# Patient Record
Sex: FEMALE | Race: WHITE | NOT HISPANIC OR LATINO | Employment: FULL TIME | URBAN - METROPOLITAN AREA
[De-identification: names, ages, dates, MRNs, and addresses within clinical notes are randomized per-mention and may not be internally consistent; named-entity substitution may affect disease eponyms.]

---

## 2023-02-17 PROBLEM — E34.9 HORMONAL DISORDER: Status: ACTIVE | Noted: 2023-02-17

## 2023-02-17 PROBLEM — R20.2 NUMBNESS AND TINGLING IN RIGHT HAND: Status: ACTIVE | Noted: 2023-02-17

## 2023-02-17 PROBLEM — R53.82 CHRONIC FATIGUE: Status: ACTIVE | Noted: 2023-02-17

## 2023-02-17 PROBLEM — G56.01 CARPAL TUNNEL SYNDROME OF RIGHT WRIST: Status: ACTIVE | Noted: 2023-02-17

## 2023-02-17 PROBLEM — M46.1 SACROILIITIS (CMS-HCC): Status: ACTIVE | Noted: 2023-02-17

## 2023-02-17 PROBLEM — R20.0 NUMBNESS AND TINGLING IN RIGHT HAND: Status: ACTIVE | Noted: 2023-02-17

## 2023-02-17 PROBLEM — K63.5 POLYP OF COLON: Status: ACTIVE | Noted: 2023-02-17

## 2023-02-17 PROBLEM — M25.531 RIGHT WRIST PAIN: Status: ACTIVE | Noted: 2023-02-17

## 2023-02-17 PROBLEM — K58.9 IRRITABLE BOWEL SYNDROME: Status: ACTIVE | Noted: 2023-02-17

## 2023-02-17 PROBLEM — E78.5 HYPERLIPEMIA: Status: ACTIVE | Noted: 2023-02-17

## 2023-02-17 PROBLEM — F41.9 ANXIETY: Status: ACTIVE | Noted: 2023-02-17

## 2023-02-17 PROBLEM — F32.A MILD DEPRESSION: Status: ACTIVE | Noted: 2023-02-17

## 2023-02-17 PROBLEM — E55.9 VITAMIN D DEFICIENCY: Status: ACTIVE | Noted: 2023-02-17

## 2023-02-17 PROBLEM — R23.2 HOT FLASHES: Status: ACTIVE | Noted: 2023-02-17

## 2023-02-17 RX ORDER — VENLAFAXINE HYDROCHLORIDE 75 MG/1
1 CAPSULE, EXTENDED RELEASE ORAL
COMMUNITY
Start: 2022-04-14 | End: 2023-04-17

## 2023-02-17 RX ORDER — MULTIVITAMIN
1 TABLET ORAL DAILY
COMMUNITY
Start: 2018-11-27

## 2023-02-17 RX ORDER — IBUPROFEN 800 MG/1
1 TABLET ORAL EVERY 8 HOURS PRN
COMMUNITY
Start: 2022-03-15

## 2023-02-17 RX ORDER — ACETAMINOPHEN 500 MG
1 TABLET ORAL DAILY
COMMUNITY
Start: 2018-11-27

## 2023-04-17 ENCOUNTER — OFFICE VISIT (OUTPATIENT)
Dept: PRIMARY CARE | Facility: CLINIC | Age: 53
End: 2023-04-17
Payer: COMMERCIAL

## 2023-04-17 VITALS
DIASTOLIC BLOOD PRESSURE: 74 MMHG | SYSTOLIC BLOOD PRESSURE: 110 MMHG | BODY MASS INDEX: 22.07 KG/M2 | OXYGEN SATURATION: 96 % | HEART RATE: 88 BPM | WEIGHT: 140.6 LBS | TEMPERATURE: 97.7 F | RESPIRATION RATE: 16 BRPM | HEIGHT: 67 IN

## 2023-04-17 DIAGNOSIS — Z00.00 ROUTINE MEDICAL EXAM: Primary | ICD-10-CM

## 2023-04-17 DIAGNOSIS — E78.2 MIXED HYPERLIPIDEMIA: ICD-10-CM

## 2023-04-17 DIAGNOSIS — M25.552 LEFT HIP PAIN: ICD-10-CM

## 2023-04-17 DIAGNOSIS — Z78.0 MENOPAUSE: ICD-10-CM

## 2023-04-17 DIAGNOSIS — Z12.31 BREAST CANCER SCREENING BY MAMMOGRAM: ICD-10-CM

## 2023-04-17 DIAGNOSIS — E55.9 VITAMIN D DEFICIENCY: ICD-10-CM

## 2023-04-17 DIAGNOSIS — Z87.891 FORMER SMOKER: ICD-10-CM

## 2023-04-17 DIAGNOSIS — F32.A MILD DEPRESSION: ICD-10-CM

## 2023-04-17 PROCEDURE — 99212 OFFICE O/P EST SF 10 MIN: CPT | Performed by: NURSE PRACTITIONER

## 2023-04-17 PROCEDURE — 99396 PREV VISIT EST AGE 40-64: CPT | Performed by: NURSE PRACTITIONER

## 2023-04-17 PROCEDURE — 3008F BODY MASS INDEX DOCD: CPT | Performed by: NURSE PRACTITIONER

## 2023-04-17 PROCEDURE — 1036F TOBACCO NON-USER: CPT | Performed by: NURSE PRACTITIONER

## 2023-04-17 RX ORDER — VENLAFAXINE HYDROCHLORIDE 150 MG/1
150 CAPSULE, EXTENDED RELEASE ORAL DAILY
Qty: 30 CAPSULE | Refills: 11 | Status: SHIPPED | OUTPATIENT
Start: 2023-04-17 | End: 2024-04-11 | Stop reason: SDUPTHER

## 2023-04-17 ASSESSMENT — ENCOUNTER SYMPTOMS
NECK STIFFNESS: 0
PALPITATIONS: 0
DEPRESSION: 0
HEMATURIA: 0
BLOOD IN STOOL: 0
EYE REDNESS: 0
COUGH: 0
FATIGUE: 0
SHORTNESS OF BREATH: 0
CHILLS: 0
ADENOPATHY: 0
ABDOMINAL PAIN: 0
BACK PAIN: 0
WEAKNESS: 0
RHINORRHEA: 0
HEADACHES: 0
WOUND: 0
OCCASIONAL FEELINGS OF UNSTEADINESS: 0
LOSS OF SENSATION IN FEET: 0
FEVER: 0
POLYDIPSIA: 0
SORE THROAT: 0
BRUISES/BLEEDS EASILY: 0
HALLUCINATIONS: 0
DYSURIA: 0
EYE PAIN: 0

## 2023-04-17 ASSESSMENT — PROMIS GLOBAL HEALTH SCALE
CARRYOUT_PHYSICAL_ACTIVITIES: COMPLETELY
EMOTIONAL_PROBLEMS: SOMETIMES
RATE_SOCIAL_SATISFACTION: VERY GOOD
RATE_MENTAL_HEALTH: GOOD
RATE_PHYSICAL_HEALTH: GOOD
RATE_AVERAGE_PAIN: 3
CARRYOUT_SOCIAL_ACTIVITIES: GOOD
RATE_GENERAL_HEALTH: GOOD
RATE_QUALITY_OF_LIFE: VERY GOOD
RATE_AVERAGE_FATIGUE: MILD

## 2023-04-17 ASSESSMENT — PATIENT HEALTH QUESTIONNAIRE - PHQ9
SUM OF ALL RESPONSES TO PHQ9 QUESTIONS 1 AND 2: 0
1. LITTLE INTEREST OR PLEASURE IN DOING THINGS: NOT AT ALL
2. FEELING DOWN, DEPRESSED OR HOPELESS: NOT AT ALL

## 2023-04-17 NOTE — ASSESSMENT & PLAN NOTE
Completed 4/17/2023, exam benign with BMI 22, patient is a non-smoker, up-to-date on vaccinations, colonoscopy completed 10/31/2022--repeat in 3 years, s/p total hysterectomy in 2013, mammogram ordered, DEXA scan ordered, patient given requisition for blood work

## 2023-04-17 NOTE — PROGRESS NOTES
Jaimie Gardner is a 53 y.o. female with Chief Complaint of Annual Exam.    HPI: 53-year-old female here for CPE.  She reports she is taking her medication as prescribed, denies any side effects.  She is requesting that we resume her previous dose of 150 mg venlafaxine as she feels her anxiety and depression got a little worse after decrease to 75 mg.  She does complain of significant left hip pain.  States that it will often occur after she has been sitting, when she goes to stand up and start walking she will have severe pain in her lateral buttock, she denies any radiation of the pain into the groin or down the leg, denies any back pain, no numbness or tingling.  She states that at its worst the pain is 8/10, will often go almost completely away after she is up on her feet for a little bit, is taking nothing over-the-counter for pain management.  She denies any other acute issues or concerns.  Discussed recommended screening and blood work.  Recent Surgeries in Family Medicine            No cases to display          Social History     Socioeconomic History    Marital status:      Spouse name: Not on file    Number of children: Not on file    Years of education: Not on file    Highest education level: Not on file   Occupational History    Not on file   Tobacco Use    Smoking status: Former     Types: Cigarettes     Passive exposure: Past    Smokeless tobacco: Never   Vaping Use    Vaping status: Never Used   Substance and Sexual Activity    Alcohol use: Not Currently    Drug use: Never    Sexual activity: Defer   Other Topics Concern    Not on file   Social History Narrative    Not on file     Social Determinants of Health     Financial Resource Strain: Not on file   Food Insecurity: Not on file   Transportation Needs: Not on file   Physical Activity: Not on file   Stress: Not on file   Social Connections: Not on file   Intimate Partner Violence: Not on file   Housing Stability: Not on file     Past Medical  History:   Diagnosis Date    Acute atopic conjunctivitis, right eye 08/03/2021    Allergic conjunctivitis of right eye    Body mass index (BMI) 23.0-23.9, adult 04/14/2021    BMI 23.0-23.9, adult    Cellulitis of face 06/03/2020    Cellulitis diffuse, face    Low back pain, unspecified 11/27/2018    Acute low back pain    Low back pain, unspecified 11/27/2018    Acute bilateral low back pain without sciatica    Other mucopurulent conjunctivitis, right eye 05/31/2017    Pink eye, right    Sprain of other parts of lumbar spine and pelvis, initial encounter 11/27/2018    Sacrum sprain    Unspecified conjunctivitis 06/03/2020    Right conjunctivitis    Unspecified symptoms and signs involving the genitourinary system 01/28/2020    UTI symptoms      Family History   Problem Relation Name Age of Onset    Depression Mother      Ovarian cancer Mother      Asthma Father      Hypertension Father      Hyperthyroidism Maternal Grandmother        Immunization History   Administered Date(s) Administered    DTaP 03/10/2021    Influenza, Unspecified 11/20/2014, 12/15/2021    Influenza, seasonal, injectable 10/02/2020    Moderna SARS-CoV-2 Vaccination 01/06/2021, 02/03/2021    Pfizer Purple Cap SARS-CoV-2 12/16/2021    TD (adult), 2 Lf tetanus toxoid, preservative free, adsorbed 03/03/2011    Td (adult), 5 Lf tetanus toxoid, preservative free, adsorbed 07/12/1996    Tdap 03/10/2021        Review of Systems   Constitutional:  Negative for chills, fatigue and fever.   HENT:  Negative for rhinorrhea and sore throat.    Eyes:  Negative for pain and redness.   Respiratory:  Negative for cough and shortness of breath.    Cardiovascular:  Negative for chest pain and palpitations.   Gastrointestinal:  Negative for abdominal pain and blood in stool.   Endocrine: Negative for polydipsia and polyuria.   Genitourinary:  Negative for dysuria and hematuria.   Musculoskeletal:  Negative for back pain and neck stiffness.        Left hip pain as per  "HPI   Skin:  Negative for rash and wound.   Allergic/Immunologic: Negative for environmental allergies and food allergies.   Neurological:  Negative for weakness and headaches.   Hematological:  Negative for adenopathy. Does not bruise/bleed easily.   Psychiatric/Behavioral:  Negative for hallucinations and suicidal ideas.       /74 (BP Location: Left arm, Patient Position: Sitting, BP Cuff Size: Adult)   Pulse 88   Temp 36.5 °C (97.7 °F) (Temporal)   Resp 16   Ht 1.702 m (5' 7\")   Wt 63.8 kg (140 lb 9.6 oz)   SpO2 96%   BMI 22.02 kg/m²   Physical Exam  Vitals reviewed.   Constitutional:       General: She is not in acute distress.     Appearance: She is not ill-appearing.   HENT:      Head: Normocephalic and atraumatic.      Right Ear: Tympanic membrane normal.      Left Ear: Tympanic membrane normal.      Nose: No congestion or rhinorrhea.      Mouth/Throat:      Pharynx: No oropharyngeal exudate or posterior oropharyngeal erythema.   Eyes:      Extraocular Movements: Extraocular movements intact.      Conjunctiva/sclera: Conjunctivae normal.      Pupils: Pupils are equal, round, and reactive to light.   Cardiovascular:      Rate and Rhythm: Normal rate and regular rhythm.      Heart sounds: No murmur heard.     No friction rub. No gallop.   Pulmonary:      Effort: Pulmonary effort is normal.      Breath sounds: Normal breath sounds. No wheezing, rhonchi or rales.   Abdominal:      General: There is no distension.      Palpations: Abdomen is soft.      Tenderness: There is no abdominal tenderness. There is no guarding or rebound.   Musculoskeletal:         General: No swelling or deformity.      Cervical back: Normal range of motion and neck supple.      Right lower leg: No edema.      Left lower leg: No edema.   Skin:     Capillary Refill: Capillary refill takes less than 2 seconds.      Coloration: Skin is not jaundiced.      Findings: No rash.   Neurological:      General: No focal deficit present. "      Mental Status: She is alert.      Motor: No weakness.   Psychiatric:         Mood and Affect: Mood normal.         Behavior: Behavior normal.       Lab Results   Component Value Date    WBC 5.7 04/14/2022    HGB 14.8 04/14/2022    HCT 46.4 (H) 04/14/2022    MCV 94 04/14/2022     04/14/2022       Chemistry    Lab Results   Component Value Date/Time     04/14/2022 0745    K 4.3 04/14/2022 0745     04/14/2022 0745    CO2 29 04/14/2022 0745    BUN 15 04/14/2022 0745    CREATININE 0.93 04/14/2022 0745    Lab Results   Component Value Date/Time    CALCIUM 9.7 04/14/2022 0745    ALKPHOS 57 04/14/2022 0745    AST 20 04/14/2022 0745    ALT 16 04/14/2022 0745    BILITOT 0.7 04/14/2022 0745             Assessment/Plan   Problem List Items Addressed This Visit          Musculoskeletal    Left hip pain     Patient with a history of sacroiliitis, will hold off on steroid course at this time as pain is on and off, continue with over-the-counter ibuprofen as needed         Relevant Orders    XR hip left 2 or 3 views       Endocrine/Metabolic    Vitamin D deficiency    Relevant Orders    Vitamin D 25-Hydroxy,Total       Other    Hyperlipemia    Mild depression     Increase venlafaxine to 150 mg daily         Relevant Medications    venlafaxine XR (Effexor-XR) 150 mg 24 hr capsule    BMI 22.0-22.9, adult    Routine medical exam - Primary     Completed 4/17/2023, exam benign with BMI 22, patient is a non-smoker, up-to-date on vaccinations, colonoscopy completed 10/31/2022--repeat in 3 years, s/p total hysterectomy in 2013, mammogram ordered, DEXA scan ordered, patient given requisition for blood work         Relevant Orders    CBC    Lipid panel    Comprehensive metabolic panel     Other Visit Diagnoses       Menopause        Relevant Orders    XR DEXA bone density    Breast cancer screening by mammogram        Relevant Orders    BI mammo bilateral screening tomosynthesis    Former smoker

## 2023-04-17 NOTE — ASSESSMENT & PLAN NOTE
Patient with a history of sacroiliitis, will hold off on steroid course at this time as pain is on and off, continue with over-the-counter ibuprofen as needed

## 2023-11-03 ENCOUNTER — TELEPHONE (OUTPATIENT)
Dept: PHYSICAL THERAPY | Facility: CLINIC | Age: 53
End: 2023-11-03
Payer: COMMERCIAL

## 2023-11-30 ENCOUNTER — PHARMACY VISIT (OUTPATIENT)
Dept: PHARMACY | Facility: CLINIC | Age: 53
End: 2023-11-30
Payer: COMMERCIAL

## 2023-11-30 PROCEDURE — RXMED WILLOW AMBULATORY MEDICATION CHARGE

## 2023-11-30 RX ORDER — NYSTATIN 100000 U/G
CREAM TOPICAL
Qty: 15 G | Refills: 0 | OUTPATIENT
Start: 2023-11-30 | End: 2024-04-11 | Stop reason: WASHOUT

## 2024-04-03 ENCOUNTER — HOSPITAL ENCOUNTER (OUTPATIENT)
Dept: RADIOLOGY | Facility: HOSPITAL | Age: 54
Discharge: HOME | End: 2024-04-03
Payer: COMMERCIAL

## 2024-04-03 VITALS — BODY MASS INDEX: 23.39 KG/M2 | WEIGHT: 149 LBS | HEIGHT: 67 IN

## 2024-04-03 DIAGNOSIS — Z12.31 SCREENING MAMMOGRAM FOR BREAST CANCER: ICD-10-CM

## 2024-04-03 PROCEDURE — 77067 SCR MAMMO BI INCL CAD: CPT | Performed by: STUDENT IN AN ORGANIZED HEALTH CARE EDUCATION/TRAINING PROGRAM

## 2024-04-03 PROCEDURE — 77063 BREAST TOMOSYNTHESIS BI: CPT | Performed by: STUDENT IN AN ORGANIZED HEALTH CARE EDUCATION/TRAINING PROGRAM

## 2024-04-03 PROCEDURE — 77063 BREAST TOMOSYNTHESIS BI: CPT

## 2024-04-11 ENCOUNTER — OFFICE VISIT (OUTPATIENT)
Dept: PRIMARY CARE | Facility: CLINIC | Age: 54
End: 2024-04-11
Payer: COMMERCIAL

## 2024-04-11 VITALS
HEART RATE: 90 BPM | OXYGEN SATURATION: 97 % | BODY MASS INDEX: 21.97 KG/M2 | HEIGHT: 67 IN | WEIGHT: 140 LBS | DIASTOLIC BLOOD PRESSURE: 72 MMHG | SYSTOLIC BLOOD PRESSURE: 111 MMHG

## 2024-04-11 DIAGNOSIS — E78.2 MIXED HYPERLIPIDEMIA: ICD-10-CM

## 2024-04-11 DIAGNOSIS — E55.9 VITAMIN D DEFICIENCY: ICD-10-CM

## 2024-04-11 DIAGNOSIS — F41.9 ANXIETY: ICD-10-CM

## 2024-04-11 DIAGNOSIS — F32.A MILD DEPRESSION: ICD-10-CM

## 2024-04-11 DIAGNOSIS — Z00.00 WELLNESS EXAMINATION: Primary | ICD-10-CM

## 2024-04-11 DIAGNOSIS — R23.2 HOT FLASHES: ICD-10-CM

## 2024-04-11 PROBLEM — M25.531 RIGHT WRIST PAIN: Status: RESOLVED | Noted: 2023-02-17 | Resolved: 2024-04-11

## 2024-04-11 PROCEDURE — 1036F TOBACCO NON-USER: CPT | Performed by: NURSE PRACTITIONER

## 2024-04-11 PROCEDURE — 3008F BODY MASS INDEX DOCD: CPT | Performed by: NURSE PRACTITIONER

## 2024-04-11 PROCEDURE — RXMED WILLOW AMBULATORY MEDICATION CHARGE

## 2024-04-11 PROCEDURE — 99396 PREV VISIT EST AGE 40-64: CPT | Performed by: NURSE PRACTITIONER

## 2024-04-11 RX ORDER — VENLAFAXINE HYDROCHLORIDE 150 MG/1
150 CAPSULE, EXTENDED RELEASE ORAL DAILY
Qty: 90 CAPSULE | Refills: 3 | Status: SHIPPED | OUTPATIENT
Start: 2024-04-11 | End: 2025-04-11

## 2024-04-11 ASSESSMENT — ENCOUNTER SYMPTOMS
DEPRESSION: 0
LOSS OF SENSATION IN FEET: 0
OCCASIONAL FEELINGS OF UNSTEADINESS: 0

## 2024-04-11 NOTE — PROGRESS NOTES
"Subjective   Patient ID: Jaimie Gardner is a 54 y.o. female who presents for New Patient Visit (Patient is here today to establish care with a new pcp. Patient does need refills on her medications. ).    54 year old female here to establish care and in need of annual CPE. Has a rash LT flank, appt scheduled with  Derm  Mamm reviewed- reviewed with pt. No evidence malignancy  Colon cancer screen- 2022- rec 3-5 year follow up  Chronic anxiety, depression- effexor XR 150mg - works well.   \"Hormonal\". Hot flashes. TSH has been nml.   Denies CP, SOB N&V  No katheryn  Works in management Occupational therapist  Opthal: routine, wears glasses  Dental: routine, no concerns              Review of Systems    Objective   /72   Pulse 90   Ht 1.702 m (5' 7\")   Wt 63.5 kg (140 lb)   SpO2 97%   BMI 21.93 kg/m²     Physical Exam  Vitals and nursing note reviewed.   Constitutional:       General: She is not in acute distress.     Appearance: Normal appearance. She is normal weight.   HENT:      Head: Normocephalic and atraumatic.      Right Ear: External ear normal.      Left Ear: External ear normal.      Nose: Nose normal.      Mouth/Throat:      Mouth: Mucous membranes are moist.      Pharynx: Oropharynx is clear.   Eyes:      Extraocular Movements: Extraocular movements intact.      Conjunctiva/sclera: Conjunctivae normal.      Pupils: Pupils are equal, round, and reactive to light.   Neck:      Vascular: No carotid bruit.      Comments: Thyroid non palpable  Cardiovascular:      Rate and Rhythm: Normal rate and regular rhythm.      Pulses: Normal pulses.      Heart sounds: Normal heart sounds.   Pulmonary:      Effort: Pulmonary effort is normal.      Breath sounds: Normal breath sounds.   Abdominal:      Palpations: Abdomen is soft.   Musculoskeletal:         General: Normal range of motion.      Cervical back: Normal range of motion and neck supple.      Right lower leg: No edema.      Left lower leg: No edema. "   Lymphadenopathy:      Cervical: No cervical adenopathy.   Skin:     General: Skin is warm and dry.      Capillary Refill: Capillary refill takes less than 2 seconds.   Neurological:      General: No focal deficit present.      Mental Status: She is alert and oriented to person, place, and time.   Psychiatric:         Mood and Affect: Mood normal.         Behavior: Behavior normal.         Thought Content: Thought content normal.         Judgment: Judgment normal.         Assessment/Plan   Diagnoses and all orders for this visit:  Wellness examination  Comments:  UTD on screening. update labs- CBC, CMP, FLP  Orders:  -     CBC and Auto Differential; Future  -     Comprehensive Metabolic Panel; Future  Mild depression  Comments:  Vitamin D, B12  Orders:  -     venlafaxine XR (Effexor-XR) 150 mg 24 hr capsule; Take 1 capsule (150 mg) by mouth once daily. Take with food.  -     Vitamin B12; Future  -     Vitamin D 25-Hydroxy,Total (for eval of Vitamin D levels); Future  -     Thyroid Stimulating Hormone; Future  -     Thyroxine, Free; Future  Anxiety  Comments:  Chronic well-controlled on venlafaxine.  She needs refill  Orders:  -     venlafaxine XR (Effexor-XR) 150 mg 24 hr capsule; Take 1 capsule (150 mg) by mouth once daily. Take with food.  -     Vitamin B12; Future  -     Vitamin D 25-Hydroxy,Total (for eval of Vitamin D levels); Future  -     Thyroid Stimulating Hormone; Future  -     Thyroxine, Free; Future  Hot flashes  Comments:  Can try over-the-counter magnesium supplementation.  If this is not beneficial we can always add clonidine this was discussed with patient  Orders:  -     Magnesium; Future  -     Thyroid Stimulating Hormone; Future  -     Thyroxine, Free; Future  Mixed hyperlipidemia  Comments:  Update fasting lipid panel she is not on medication.  Orders:  -     Lipid Panel; Future  Vitamin D deficiency  Comments:  Update vitamin D level.  She is on 2000 a day  Orders:  -     Vitamin D  25-Hydroxy,Total (for eval of Vitamin D levels); Future  Other orders  -     Follow Up In Primary Care - Health Maintenance; Future

## 2024-04-13 ENCOUNTER — PHARMACY VISIT (OUTPATIENT)
Dept: PHARMACY | Facility: CLINIC | Age: 54
End: 2024-04-13
Payer: COMMERCIAL

## 2024-04-29 ENCOUNTER — OFFICE VISIT (OUTPATIENT)
Dept: DERMATOLOGY | Facility: CLINIC | Age: 54
End: 2024-04-29
Payer: COMMERCIAL

## 2024-04-29 DIAGNOSIS — D18.01 ANGIOMA OF SKIN: Primary | ICD-10-CM

## 2024-04-29 DIAGNOSIS — L81.4 LENTIGO: ICD-10-CM

## 2024-04-29 DIAGNOSIS — L57.9 SKIN CHANGES DUE TO CHRONIC EXPOSURE TO NONIONIZING RADIATION: ICD-10-CM

## 2024-04-29 DIAGNOSIS — D22.9 BENIGN NEVUS: ICD-10-CM

## 2024-04-29 DIAGNOSIS — L82.1 SEBORRHEIC KERATOSIS: ICD-10-CM

## 2024-04-29 DIAGNOSIS — L30.9 DERMATITIS: ICD-10-CM

## 2024-04-29 PROCEDURE — 99213 OFFICE O/P EST LOW 20 MIN: CPT | Performed by: NURSE PRACTITIONER

## 2024-04-29 PROCEDURE — 1036F TOBACCO NON-USER: CPT | Performed by: NURSE PRACTITIONER

## 2024-04-29 RX ORDER — TRIAMCINOLONE ACETONIDE 1 MG/G
CREAM TOPICAL
Qty: 80 G | Refills: 0 | Status: SHIPPED | OUTPATIENT
Start: 2024-04-29

## 2024-04-29 ASSESSMENT — ITCH NUMERIC RATING SCALE: HOW SEVERE IS YOUR ITCHING?: 6

## 2024-04-29 NOTE — PATIENT INSTRUCTIONS

## 2024-04-29 NOTE — PROGRESS NOTES
Laura Gardner is a 54 y.o. female who presents for the following: Rash (Down right side) and Skin Check.     Review of Systems:  No other skin or systemic complaints other than what is documented elsewhere in the note.    The following portions of the chart were reviewed this encounter and updated as appropriate:   Tobacco  Allergies  Meds  Problems  Med Hx  Surg Hx         Skin Cancer History  No skin cancer on file.      Specialty Problems    None       Objective   Well appearing patient in no apparent distress; mood and affect are within normal limits.    A full examination was performed including scalp, head, eyes, ears, nose, lips, neck, chest, axillae, abdomen, back, buttocks, bilateral upper extremities, bilateral lower extremities, hands, feet, fingers, toes, fingernails, and toenails. All findings within normal limits unless otherwise noted below.      Assessment/Plan   1. Angioma of skin  Scattered cherry-red papule(s).    A cherry hemangioma is a small macule (small, flat, smooth area) or papule (small, solid bump) formed from an overgrowth of tiny blood vessels in the skin. Cherry hemangiomas are characteristically red or purplish in color. They often first appear in middle adulthood and usually increase in number with age. Cherry hemangiomas are noncancerous (benign) and are common in adults.    The present appearance of the lesion is not worrisome but it should continue to be observed and testing/treatment may be warranted if change occurs.    Related Procedures  Follow Up In Dermatology - Established Patient    2. Benign nevus  Scattered, uniform and benign-appearing, regular brown melanocytic papules and macules.      Right zygoma has a 11 x 11 mm somewhat uniform tan patch somewhat feature poor.     The present appearance of the lesion is not worrisome but it should continue to be observed and testing/treatment may be warranted if change occurs.    She reports right zygoma  lesion present for at least 6-7 years and is unaware of change. Advised to monitor and if change is noted to return to clinic sooner. Patient will consider taking photo of lesion  for monitoring and recheck monthly.     Related Procedures  Follow Up In Dermatology - Established Patient    3. Seborrheic keratosis  Stuck on verrucous, tan-brown papules and plaques.      Seborrheic keratoses are common noncancerous (benign) growths of unknown cause seen in adults due to a thickening of an area of the top skin layer. Seborrheic keratoses may appear as if they are stuck on to the skin. They have distinct borders, and they may appear as papules (small, solid bumps) or plaques (solid, raised patches that are bigger than a thumbnail). They may be the same color as your skin, or they may be pink, light brown, darker brown, or very dark brown, or sometimes may appear black.    There is no way to prevent new seborrheic keratoses from forming. Seborrheic keratoses can be removed, but removal is considered a cosmetic issue and is usually not covered by insurance.    PLAN  No treatment is needed unless there is irritation from clothing, such as itching or bleeding.  2.   Some lotions containing alpha hydroxy acids, salicylic acid, or urea may make the areas feel smoother with regular use but will not eliminate them.    Related Procedures  Follow Up In Dermatology - Established Patient    4. Lentigo  Scattered tan macules in sun-exposed areas.    A solar lentigo (plural, solar lentigines), also known as a sun-induced freckle or senile lentigo, is a dark (hyperpigmented) lesion caused by natural or artificial ultraviolet (UV) light. Solar lentigines may be single or multiple. This type of lentigo is different from a simple lentigo (lentigo simplex) because it is caused by exposure to UV light. Solar lentigines are benign, but they do indicate excessive sun exposure, a risk factor for the development of skin cancer.    To prevent  solar lentigines, avoid exposure to sunlight in midday (10 AM to 3 PM), wear sun-protective clothing (tightly woven clothes and hats), and apply sunscreen (SPF 30 UVA and UVB block).    The present appearance of the lesion is not worrisome but it should continue to be observed and testing/treatment may be warranted if change occurs.    Related Procedures  Follow Up In Dermatology - Established Patient    5. Skin changes due to chronic exposure to nonionizing radiation  Actinic changes in the form of freckles, lentigines and hyper/hypopigmentation     ABCDEs of melanoma and atypical moles were discussed with the patient.    Patient was instructed to perform monthly self skin examination.  We recommended that the patient have regular full skin exams given an increased risk of subsequent skin cancers.    The patient was instructed to use sun protective behaviors including use of broad spectrum sunscreens and sun protective clothing to reduce risk of skin cancers.    Warning signs of non-melanoma skin cancer discussed.    Related Procedures  Follow Up In Dermatology - Established Patient    6. Dermatitis  Left Flank, Right Flank  Poorly demarcated faint pink semi scaly patches.     Consistent with mild dermatitis NOS. Denies personal hx of eczema or family hx of eczema. Patient has generalized mild to moderate dry scaly. Encouraged routine moisturizing cream application. For flare ups, use TAC BID PRN. Avoid using more than 14 days a month. If requiring routine use, return to clinic sooner.     PLAN:  1  See Patient care instructions and follow as discussed  2.  Vaseline ointment or other moisturizing cream at least twice daily   3.  Apply Triamcinolone 0.1% ointment twice daily as needed. When using as needed, use less than 14 days per month.    The following information regarding the use of topical steroids was provided: Local skin thinning,striae, and telangiectasia can occur with chronic application of this  medication.  Long term use oftopical steroids should be avoided      Related Procedures  Follow Up In Dermatology - Established Patient    Related Medications  triamcinolone (Kenalog) 0.1 % cream  Apply to affected areas twice daily for 2 weeks then daily for 1 week then every other day for 1 week then stop. Then may used as needed when active. When using for maintenance, use less than 14 days per month.        Return to clinic in 1 year for skin check/follow up or sooner if needed

## 2024-07-19 ENCOUNTER — PHARMACY VISIT (OUTPATIENT)
Dept: PHARMACY | Facility: CLINIC | Age: 54
End: 2024-07-19
Payer: COMMERCIAL

## 2024-07-19 PROCEDURE — RXMED WILLOW AMBULATORY MEDICATION CHARGE

## 2024-10-19 PROCEDURE — RXMED WILLOW AMBULATORY MEDICATION CHARGE

## 2024-10-22 ENCOUNTER — PHARMACY VISIT (OUTPATIENT)
Dept: PHARMACY | Facility: CLINIC | Age: 54
End: 2024-10-22
Payer: COMMERCIAL

## 2025-01-02 ENCOUNTER — PHARMACY VISIT (OUTPATIENT)
Dept: PHARMACY | Facility: CLINIC | Age: 55
End: 2025-01-02
Payer: COMMERCIAL

## 2025-01-02 PROCEDURE — RXMED WILLOW AMBULATORY MEDICATION CHARGE

## 2025-01-02 RX ORDER — IBUPROFEN 800 MG/1
800 TABLET ORAL EVERY 8 HOURS PRN
Qty: 20 TABLET | Refills: 0 | OUTPATIENT
Start: 2025-01-02

## 2025-01-02 RX ORDER — AMOXICILLIN 875 MG/1
875 TABLET, FILM COATED ORAL EVERY 12 HOURS
Qty: 14 TABLET | Refills: 0 | OUTPATIENT
Start: 2025-01-02 | End: 2025-01-09

## 2025-02-03 PROCEDURE — RXMED WILLOW AMBULATORY MEDICATION CHARGE

## 2025-02-05 ENCOUNTER — PHARMACY VISIT (OUTPATIENT)
Dept: PHARMACY | Facility: CLINIC | Age: 55
End: 2025-02-05
Payer: COMMERCIAL

## 2025-04-07 ENCOUNTER — APPOINTMENT (OUTPATIENT)
Dept: INFECTIOUS DISEASES | Facility: CLINIC | Age: 55
End: 2025-04-07
Payer: COMMERCIAL

## 2025-04-07 ENCOUNTER — PHARMACY VISIT (OUTPATIENT)
Dept: PHARMACY | Facility: CLINIC | Age: 55
End: 2025-04-07
Payer: COMMERCIAL

## 2025-04-07 VITALS
HEART RATE: 85 BPM | BODY MASS INDEX: 22.44 KG/M2 | WEIGHT: 143 LBS | SYSTOLIC BLOOD PRESSURE: 97 MMHG | TEMPERATURE: 97.9 F | DIASTOLIC BLOOD PRESSURE: 67 MMHG | OXYGEN SATURATION: 97 % | HEIGHT: 67 IN

## 2025-04-07 DIAGNOSIS — Z91.89 AT RISK FOR NAUSEA: ICD-10-CM

## 2025-04-07 DIAGNOSIS — Z71.84 COUNSELING FOR TRAVEL: Primary | ICD-10-CM

## 2025-04-07 PROCEDURE — RXMED WILLOW AMBULATORY MEDICATION CHARGE

## 2025-04-07 PROCEDURE — 90717 YELLOW FEVER VACCINE SUBQ: CPT | Performed by: STUDENT IN AN ORGANIZED HEALTH CARE EDUCATION/TRAINING PROGRAM

## 2025-04-07 PROCEDURE — 90471U01 YELLOW FEVER VACCINE SQ: Performed by: STUDENT IN AN ORGANIZED HEALTH CARE EDUCATION/TRAINING PROGRAM

## 2025-04-07 PROCEDURE — 90691 TYPHOID VACCINE IM: CPT | Performed by: STUDENT IN AN ORGANIZED HEALTH CARE EDUCATION/TRAINING PROGRAM

## 2025-04-07 PROCEDURE — 90471 IMMUNIZATION ADMIN: CPT | Performed by: STUDENT IN AN ORGANIZED HEALTH CARE EDUCATION/TRAINING PROGRAM

## 2025-04-07 PROCEDURE — 99211U01 TRAVEL NURSE VISIT (U01): Performed by: STUDENT IN AN ORGANIZED HEALTH CARE EDUCATION/TRAINING PROGRAM

## 2025-04-07 PROCEDURE — 90632 HEPA VACCINE ADULT IM: CPT | Performed by: STUDENT IN AN ORGANIZED HEALTH CARE EDUCATION/TRAINING PROGRAM

## 2025-04-07 PROCEDURE — 90472U01 TYPHOID VICPS VACCINE IM: Performed by: STUDENT IN AN ORGANIZED HEALTH CARE EDUCATION/TRAINING PROGRAM

## 2025-04-07 RX ORDER — ONDANSETRON 4 MG/1
4 TABLET, ORALLY DISINTEGRATING ORAL EVERY 8 HOURS PRN
Qty: 20 TABLET | Refills: 0 | Status: SHIPPED | OUTPATIENT
Start: 2025-04-07 | End: 2025-04-14

## 2025-04-07 RX ORDER — CIPROFLOXACIN 500 MG/1
500 TABLET ORAL 2 TIMES DAILY
Qty: 6 TABLET | Refills: 0 | Status: SHIPPED | OUTPATIENT
Start: 2025-04-07 | End: 2025-04-10

## 2025-04-07 RX ORDER — ATOVAQUONE AND PROGUANIL HYDROCHLORIDE 250; 100 MG/1; MG/1
1 TABLET, FILM COATED ORAL DAILY
Qty: 32 TABLET | Refills: 0 | Status: SHIPPED | OUTPATIENT
Start: 2025-04-07 | End: 2025-05-09

## 2025-04-07 NOTE — PROGRESS NOTES
Part of a group of five traveling to [place] from 5/09-5/31/25.  Tour company is requiring YF vaccine. Tdap 3/2021.     Ordered YF, HepA, Typhoid ViCPs vaccines.    Prescribed Malarone (32 doses), cipro for Traveler's diarrhea, and Zofran (pt requested) for nausea.    Discussed food, water, and insect precautions.

## 2025-04-07 NOTE — PATIENT INSTRUCTIONS
You were seen today for your upcoming trip.    For your country specific issues, please refer back to the [employer] handouts supplied to you in the travel brochure folder that we provided to you at your visit.  These are updated daily, if necessary, by the reporting agencies, so are a valuable source of information for your trip.    This brief summary may not contain all of the items that we discussed today.  Please review the handouts given to you as well.    ** Please be sure to carry any medications with you in your carry-on luggage in the event that your luggage is delayed or lost during your travels.    ** For your trip, as we discussed, standard food and water precautions apply.     You should avoid drinking any water that is not bottled.  Alcoholic or carbonated beverages are safe to drink.  Any beverage that has been brought to a rolling boil for  3 minutes is also safe to drink (once it has cooled some).  Commercially purchased milk products that are boxed (may be on store shelves) or refrigerated, are pasteurized and therefore safe to drink as long as they are refrigerated after opening.  Juices that are prepared commercially (in boxes or individual bottles) are also safe to drink as they are pasteurized as well.  Avoid road-side juice vendors as the juice may be diluted with contaminated water or produced from unclean fruit.  Regarding food precautions, you want to make sure that meats are well cooked.   Avoid eating fresh fruits and vegetables raw with the skin intact.  Peel before eating.  Avoid salads due to possible contamination by contaminated water.  Avoid any unpasteurized cheeses (they typically are very white in appearance)    ** We recommend that you use insect repellant to help you prevent infections caused by biting insects such as mosquitoes, flies, ticks, fleas and chiggers.  This includes protection against Zika virus, Dengue virus, Chikungunya and Malaria, just to name a few.    For insect  "repellents that are applied directly to the skin, we recommend DEET containing repellants with a percentage between 20-40%.  Apply to skin exposed surfaces only, every 6-8 hours throughout the day.  I typically recommend applying before breakfast, lunch and dinner as these are natural breaks in your day.  Wash your hands after applying. Apply again in the evening.  You must reapply after bathing or swimming as it is washed away with water.  Apply after sunscreen products are applied. DEET containing repellants protect against all concerning insects with the exception of hornets, wasps or bees. Activity against ticks only lasts for 2 hours. For additional Tick precautions while hiking, tuck your pant legs into your socks as this will prevent ticks from crawling up your shoes / socks onto your legs while walking. Perform a \"tick check\" at least once daily, at the end of your day.  Check in the sporting goods areas of most stores for these products.  A recommended alternative, Picardin ,may also be used every 8 hours.  If you are unable to locate the product in stores, try on-line stores as well.      PERMETHRIN SPRAY  is an additional option and is for application to clothing and garments only.  This can be applied to hats, bandanas, socks, boots and any clothing items to prevent insect attention.  I can be applied before you leave and will remain active through many washes and for up to 6 weeks in unwashed clothing.  Read any packaging for full specifications. Apply in an open area as when wet, it has an odor. Once dry, it typically has no odor and does not stain clothing. Regular repellants should be applied to exposed skin however.  Permethrin may only be available on-line.     **  As a general safety procedure, we recommend that you scan a copy of your passport, any travel required documents (yellow fever vaccine card), and itinerary and email them to yourself.  Keep these in a special travel folder for reference " in the event that your travel documents are misplaced or stolen while abroad.  You should also leave a detailed copy of your itinerary with a friend or relative at home for reference as well.  If traveling for long periods, an international SIM card or global plan for your cellular service may be beneficial for communication. This list is not meant to be all inclusive.      **  Please review and understand any cultural aspects of the countries that you will be visiting to ensure that appropriate dress and behaviors are understood.  ENJOY YOUR TRIP (o:      **  Make sure to come back to complete any vaccine series that may have been started today.  This will ensure full vaccine efficacy and protection for future travel.    Today, you received the Yellow Fever vaccine, which is good for life, the Hepatitis A vaccine, which will protect you from Hepatitis A for your upcoming trip, and the Typhoid vaccine, which is good for two years. In order to be protected from Hepatitis A for 20+ years, you will want to get the second Hepatitis A injection in 6-18 months. I sent prescriptions for malaria prevention medicine, ciprofloxacin for Traveler's diarrhea, and ondansetron for nausea to your pharmacy.  Have a great trip!

## 2025-04-17 ENCOUNTER — APPOINTMENT (OUTPATIENT)
Dept: PRIMARY CARE | Facility: CLINIC | Age: 55
End: 2025-04-17
Payer: COMMERCIAL

## 2025-04-17 VITALS
WEIGHT: 140 LBS | HEART RATE: 99 BPM | BODY MASS INDEX: 21.97 KG/M2 | DIASTOLIC BLOOD PRESSURE: 79 MMHG | OXYGEN SATURATION: 95 % | SYSTOLIC BLOOD PRESSURE: 121 MMHG | HEIGHT: 67 IN

## 2025-04-17 DIAGNOSIS — F41.9 ANXIETY: ICD-10-CM

## 2025-04-17 DIAGNOSIS — Z12.11 COLON CANCER SCREENING: ICD-10-CM

## 2025-04-17 DIAGNOSIS — E78.2 MIXED HYPERLIPIDEMIA: ICD-10-CM

## 2025-04-17 DIAGNOSIS — F32.A MILD DEPRESSION: ICD-10-CM

## 2025-04-17 DIAGNOSIS — Z12.31 BREAST CANCER SCREENING BY MAMMOGRAM: ICD-10-CM

## 2025-04-17 DIAGNOSIS — N95.1 MENOPAUSAL STATE: ICD-10-CM

## 2025-04-17 DIAGNOSIS — E55.9 VITAMIN D DEFICIENCY: ICD-10-CM

## 2025-04-17 DIAGNOSIS — Z00.00 WELLNESS EXAMINATION: Primary | ICD-10-CM

## 2025-04-17 PROCEDURE — 99396 PREV VISIT EST AGE 40-64: CPT | Performed by: NURSE PRACTITIONER

## 2025-04-17 PROCEDURE — 3008F BODY MASS INDEX DOCD: CPT | Performed by: NURSE PRACTITIONER

## 2025-04-17 PROCEDURE — 1036F TOBACCO NON-USER: CPT | Performed by: NURSE PRACTITIONER

## 2025-04-17 PROCEDURE — RXMED WILLOW AMBULATORY MEDICATION CHARGE

## 2025-04-17 RX ORDER — BUSPIRONE HYDROCHLORIDE 5 MG/1
5 TABLET ORAL 2 TIMES DAILY PRN
Qty: 60 TABLET | Refills: 1 | Status: SHIPPED | OUTPATIENT
Start: 2025-04-17 | End: 2026-04-17

## 2025-04-17 RX ORDER — VENLAFAXINE HYDROCHLORIDE 150 MG/1
150 CAPSULE, EXTENDED RELEASE ORAL DAILY
Qty: 90 CAPSULE | Refills: 3 | Status: SHIPPED | OUTPATIENT
Start: 2025-04-17 | End: 2026-04-17

## 2025-04-17 ASSESSMENT — PATIENT HEALTH QUESTIONNAIRE - PHQ9
SUM OF ALL RESPONSES TO PHQ9 QUESTIONS 1 AND 2: 0
2. FEELING DOWN, DEPRESSED OR HOPELESS: NOT AT ALL
1. LITTLE INTEREST OR PLEASURE IN DOING THINGS: NOT AT ALL

## 2025-04-17 NOTE — PROGRESS NOTES
Subjective   Jaimie Gardner is a 55 y.o. female who presents for Annual Exam (Patient is here today for a physical. ).  55 year old female here for her annual CPE  She is due for labs.   Will be going to [place] in a month. Went to  travel center to get her physical for travel.   She has chronic anxiety on effexor    Prevention:  Breast Ca screen: mamm 4/2024. No fam HX. Mom ovarian Ca  Colon Ca Screen: 10/31/2022. Due this year. No fam HX. Adenoma. Gets screened q 3 years  Lung Ca Screen: no fam HX. Non smoker  DEXA: no fam HX  CT Cardiac Score:  grandfather- MI  Diabetes Screen: no fam HX.  Opthal: wears glasses. Due for vision check this year  Dental: routine. Getting an implant RT upper    Fam HX skin ca           The ROS have been reviewed otherwise negative except for what is in the HPI    Objective   Physical Exam  Vitals reviewed.   Constitutional:       General: She is not in acute distress.     Appearance: Normal appearance.   HENT:      Head: Normocephalic and atraumatic.   Eyes:      Extraocular Movements: Extraocular movements intact.      Conjunctiva/sclera: Conjunctivae normal.      Pupils: Pupils are equal, round, and reactive to light.   Neck:      Vascular: No carotid bruit.   Cardiovascular:      Rate and Rhythm: Normal rate and regular rhythm.      Pulses: Normal pulses.      Heart sounds: Normal heart sounds. No murmur heard.  Pulmonary:      Effort: Pulmonary effort is normal.      Breath sounds: Normal breath sounds.   Abdominal:      General: Bowel sounds are normal. There is no distension.      Palpations: Abdomen is soft.      Tenderness: There is no abdominal tenderness.   Musculoskeletal:         General: Normal range of motion.      Cervical back: Normal range of motion and neck supple.   Lymphadenopathy:      Cervical: No cervical adenopathy.   Skin:     General: Skin is warm and dry.   Neurological:      General: No focal deficit present.      Mental Status: She is alert and oriented  to person, place, and time. Mental status is at baseline.   Psychiatric:         Mood and Affect: Mood normal.         Behavior: Behavior normal.       Visit Vitals  /79   Pulse 99        Assessment/Plan   Problem List Items Addressed This Visit    None

## 2025-04-17 NOTE — PROGRESS NOTES
"Subjective   Patient ID: Jaimie Gardner is a 55 y.o. female who presents for Annual Exam (Patient is here today for a physical. ).    55 year old female here for her annual CPE  She is due for labs.   Will be going to [place] in a month. Went to  travel center to get her physical for travel.   She has chronic anxiety on effexor     Prevention:  Breast Ca screen: mamm 4/2024. No fam HX. Mom ovarian Ca  Colon Ca Screen: 10/31/2022. Due this year. No fam HX. Adenoma. Gets screened q 3 years  Lung Ca Screen: no fam HX. Non smoker  DEXA: no fam HX  CT Cardiac Score:  grandfather- MI  Diabetes Screen: no fam HX.  Opthal: wears glasses. Due for vision check this year  Dental: routine. Getting an implant RT upper     Fam HX skin ca               Review of Systems    Objective   /79   Pulse 99   Ht 1.702 m (5' 7\")   Wt 63.5 kg (140 lb)   SpO2 95%   BMI 21.93 kg/m²     Physical Exam  Vitals reviewed.   Constitutional:       General: She is not in acute distress.     Appearance: Normal appearance.   HENT:      Head: Normocephalic and atraumatic.   Eyes:      Extraocular Movements: Extraocular movements intact.      Conjunctiva/sclera: Conjunctivae normal.      Pupils: Pupils are equal, round, and reactive to light.   Neck:      Vascular: No carotid bruit.   Cardiovascular:      Rate and Rhythm: Normal rate and regular rhythm.      Pulses: Normal pulses.      Heart sounds: Normal heart sounds. No murmur heard.  Pulmonary:      Effort: Pulmonary effort is normal.      Breath sounds: Normal breath sounds.   Abdominal:      General: Bowel sounds are normal. There is no distension.      Palpations: Abdomen is soft.      Tenderness: There is no abdominal tenderness.   Musculoskeletal:         General: Normal range of motion.      Cervical back: Normal range of motion and neck supple.   Lymphadenopathy:      Cervical: No cervical adenopathy.   Skin:     General: Skin is warm and dry.   Neurological:      General: No focal " deficit present.      Mental Status: She is alert and oriented to person, place, and time. Mental status is at baseline.   Psychiatric:         Mood and Affect: Mood normal.         Behavior: Behavior normal.         Thought Content: Thought content normal.         Assessment/Plan   Diagnoses and all orders for this visit:  Wellness examination  -     CBC and Auto Differential; Future  -     Comprehensive Metabolic Panel; Future  Mixed hyperlipidemia  -     Lipid Panel; Future  Vitamin D deficiency  -     Vitamin D 25-Hydroxy,Total (for eval of Vitamin D levels); Future  Anxiety  -     Magnesium; Future  -     TSH with reflex to Free T4 if abnormal; Future  -     venlafaxine XR (Effexor-XR) 150 mg 24 hr capsule; Take 1 capsule (150 mg) by mouth once daily. Take with food.  -     busPIRone (Buspar) 5 mg tablet; Take 1 tablet (5 mg) by mouth 2 times a day as needed (anxiety).  Breast cancer screening by mammogram  -     BI mammo bilateral screening tomosynthesis; Future  Mild depression  Comments:  Vitamin D, B12  Orders:  -     venlafaxine XR (Effexor-XR) 150 mg 24 hr capsule; Take 1 capsule (150 mg) by mouth once daily. Take with food.  Anxiety  Comments:  Chronic well-controlled on venlafaxine.  She needs refill  Orders:  -     Magnesium; Future  -     TSH with reflex to Free T4 if abnormal; Future  -     venlafaxine XR (Effexor-XR) 150 mg 24 hr capsule; Take 1 capsule (150 mg) by mouth once daily. Take with food.  -     busPIRone (Buspar) 5 mg tablet; Take 1 tablet (5 mg) by mouth 2 times a day as needed (anxiety).  Menopausal state  -     Estradiol; Future  -     Progesterone; Future  -     DHEA-Sulfate; Future  -     Testosterone, total and free; Future  Colon cancer screening  -     Colonoscopy Screening; Average Risk Patient; Future  Other orders  -     Follow Up In Primary Care - Health Maintenance  -     Follow Up In Primary Care - Health Maintenance; Future

## 2025-04-22 LAB
25(OH)D3+25(OH)D2 SERPL-MCNC: 44 NG/ML (ref 30–100)
ALBUMIN SERPL-MCNC: 4.6 G/DL (ref 3.6–5.1)
ALP SERPL-CCNC: 66 U/L (ref 37–153)
ALT SERPL-CCNC: 19 U/L (ref 6–29)
ANION GAP SERPL CALCULATED.4IONS-SCNC: 10 MMOL/L (CALC) (ref 7–17)
AST SERPL-CCNC: 22 U/L (ref 10–35)
BASOPHILS # BLD AUTO: 72 CELLS/UL (ref 0–200)
BASOPHILS NFR BLD AUTO: 1 %
BILIRUB SERPL-MCNC: 0.5 MG/DL (ref 0.2–1.2)
BUN SERPL-MCNC: 16 MG/DL (ref 7–25)
CALCIUM SERPL-MCNC: 9.3 MG/DL (ref 8.6–10.4)
CHLORIDE SERPL-SCNC: 104 MMOL/L (ref 98–110)
CHOLEST SERPL-MCNC: 246 MG/DL
CHOLEST/HDLC SERPL: 4.1 (CALC)
CO2 SERPL-SCNC: 25 MMOL/L (ref 20–32)
CREAT SERPL-MCNC: 0.8 MG/DL (ref 0.5–1.03)
EGFRCR SERPLBLD CKD-EPI 2021: 88 ML/MIN/1.73M2
EOSINOPHIL # BLD AUTO: 151 CELLS/UL (ref 15–500)
EOSINOPHIL NFR BLD AUTO: 2.1 %
ERYTHROCYTE [DISTWIDTH] IN BLOOD BY AUTOMATED COUNT: 12.4 % (ref 11–15)
GLUCOSE SERPL-MCNC: 90 MG/DL (ref 65–99)
HCT VFR BLD AUTO: 42.8 % (ref 35–45)
HDLC SERPL-MCNC: 60 MG/DL
HGB BLD-MCNC: 14.4 G/DL (ref 11.7–15.5)
LDLC SERPL CALC-MCNC: 161 MG/DL (CALC)
LYMPHOCYTES # BLD AUTO: 3226 CELLS/UL (ref 850–3900)
LYMPHOCYTES NFR BLD AUTO: 44.8 %
MAGNESIUM SERPL-MCNC: 2.5 MG/DL (ref 1.5–2.5)
MCH RBC QN AUTO: 29.9 PG (ref 27–33)
MCHC RBC AUTO-ENTMCNC: 33.6 G/DL (ref 32–36)
MCV RBC AUTO: 89 FL (ref 80–100)
MONOCYTES # BLD AUTO: 403 CELLS/UL (ref 200–950)
MONOCYTES NFR BLD AUTO: 5.6 %
NEUTROPHILS # BLD AUTO: 3348 CELLS/UL (ref 1500–7800)
NEUTROPHILS NFR BLD AUTO: 46.5 %
NONHDLC SERPL-MCNC: 186 MG/DL (CALC)
PLATELET # BLD AUTO: 247 THOUSAND/UL (ref 140–400)
PMV BLD REES-ECKER: 10.9 FL (ref 7.5–12.5)
POTASSIUM SERPL-SCNC: 4.3 MMOL/L (ref 3.5–5.3)
PROT SERPL-MCNC: 7.2 G/DL (ref 6.1–8.1)
RBC # BLD AUTO: 4.81 MILLION/UL (ref 3.8–5.1)
SODIUM SERPL-SCNC: 139 MMOL/L (ref 135–146)
TRIGL SERPL-MCNC: 123 MG/DL
TSH SERPL-ACNC: 1.55 MIU/L
WBC # BLD AUTO: 7.2 THOUSAND/UL (ref 3.8–10.8)

## 2025-04-24 LAB
DHEA-S SERPL-MCNC: 80 MCG/DL (ref 5–167)
ESTRADIOL SERPL-MCNC: 16 PG/ML
PROGEST SERPL-MCNC: 0.5 NG/ML
TESTOST FREE SERPL-MCNC: 1.5 PG/ML (ref 0.1–6.4)
TESTOST SERPL-MCNC: 23 NG/DL (ref 2–45)

## 2025-04-28 ENCOUNTER — PHARMACY VISIT (OUTPATIENT)
Dept: PHARMACY | Facility: CLINIC | Age: 55
End: 2025-04-28
Payer: COMMERCIAL

## 2025-05-13 ENCOUNTER — PATIENT MESSAGE (OUTPATIENT)
Dept: CARE COORDINATION | Facility: CLINIC | Age: 55
End: 2025-05-13
Payer: COMMERCIAL

## 2025-09-04 DIAGNOSIS — Z71.84 COUNSELING FOR TRAVEL: ICD-10-CM

## 2025-09-04 RX ORDER — ATOVAQUONE AND PROGUANIL HYDROCHLORIDE 250; 100 MG/1; MG/1
1 TABLET, FILM COATED ORAL DAILY
Qty: 32 TABLET | Refills: 0 | OUTPATIENT
Start: 2025-09-04 | End: 2025-10-06

## 2026-04-23 ENCOUNTER — APPOINTMENT (OUTPATIENT)
Dept: PRIMARY CARE | Facility: CLINIC | Age: 56
End: 2026-04-23
Payer: COMMERCIAL